# Patient Record
Sex: MALE | Race: WHITE | URBAN - METROPOLITAN AREA
[De-identification: names, ages, dates, MRNs, and addresses within clinical notes are randomized per-mention and may not be internally consistent; named-entity substitution may affect disease eponyms.]

---

## 2020-07-07 ENCOUNTER — OFFICE VISIT (OUTPATIENT)
Dept: ORTHOPEDIC SURGERY | Age: 85
End: 2020-07-07
Payer: MEDICARE

## 2020-07-07 VITALS — HEIGHT: 70 IN | BODY MASS INDEX: 28.63 KG/M2 | WEIGHT: 200 LBS | RESPIRATION RATE: 17 BRPM

## 2020-07-07 PROBLEM — M72.0 DUPUYTREN'S CONTRACTURE OF HAND: Status: ACTIVE | Noted: 2020-07-07

## 2020-07-07 PROBLEM — M77.8 TENDINITIS OF FLEXOR TENDON OF RIGHT HAND: Status: ACTIVE | Noted: 2020-07-07

## 2020-07-07 PROCEDURE — 4004F PT TOBACCO SCREEN RCVD TLK: CPT | Performed by: ORTHOPAEDIC SURGERY

## 2020-07-07 PROCEDURE — 4040F PNEUMOC VAC/ADMIN/RCVD: CPT | Performed by: ORTHOPAEDIC SURGERY

## 2020-07-07 PROCEDURE — 20550 NJX 1 TENDON SHEATH/LIGAMENT: CPT | Performed by: ORTHOPAEDIC SURGERY

## 2020-07-07 PROCEDURE — G8427 DOCREV CUR MEDS BY ELIG CLIN: HCPCS | Performed by: ORTHOPAEDIC SURGERY

## 2020-07-07 PROCEDURE — 99203 OFFICE O/P NEW LOW 30 MIN: CPT | Performed by: ORTHOPAEDIC SURGERY

## 2020-07-07 PROCEDURE — 1123F ACP DISCUSS/DSCN MKR DOCD: CPT | Performed by: ORTHOPAEDIC SURGERY

## 2020-07-07 PROCEDURE — G8417 CALC BMI ABV UP PARAM F/U: HCPCS | Performed by: ORTHOPAEDIC SURGERY

## 2020-07-07 RX ORDER — BETAMETHASONE SODIUM PHOSPHATE AND BETAMETHASONE ACETATE 3; 3 MG/ML; MG/ML
6 INJECTION, SUSPENSION INTRA-ARTICULAR; INTRALESIONAL; INTRAMUSCULAR; SOFT TISSUE ONCE
Status: COMPLETED | OUTPATIENT
Start: 2020-07-07 | End: 2020-07-07

## 2020-07-07 RX ORDER — LIDOCAINE HYDROCHLORIDE 10 MG/ML
1 INJECTION, SOLUTION INFILTRATION; PERINEURAL ONCE
Status: COMPLETED | OUTPATIENT
Start: 2020-07-07 | End: 2020-07-07

## 2020-07-07 RX ADMIN — LIDOCAINE HYDROCHLORIDE 1 ML: 10 INJECTION, SOLUTION INFILTRATION; PERINEURAL at 10:51

## 2020-07-07 RX ADMIN — BETAMETHASONE SODIUM PHOSPHATE AND BETAMETHASONE ACETATE 6 MG: 3; 3 INJECTION, SUSPENSION INTRA-ARTICULAR; INTRALESIONAL; INTRAMUSCULAR; SOFT TISSUE at 10:50

## 2020-07-07 NOTE — PROGRESS NOTES
Chief Complaint  Hand Pain (NP RT HAND)      History of Present Illness:  Cherie Patricio is a 80 y.o. male. He presents today for a new hand surgery specialty evaluation regarding his right hand. Many years ago I took care of him and we believe that I took care of him for a trigger finger which resolved very nicely with a cortisone injection. He has also had some prominence along the ring finger palmar aspect consistent with Dupuytren's and he has noticed some increasing pain when he  a golf club. He has not been noticing any overt locking of the finger nor has he had a recent trauma. Other of friends of mine and he has been visiting from [place]. Medical History  Patient's medications, allergies, past medical, surgical, social and family histories were reviewed and updated as appropriate. Review of Systems  Pertinent items are noted in HPI  Denies fever, chills, confusion, bowel/bladder active change. Review of systems reviewed from Patient History Form dated on 7/7/20 and available in the patient's chart under the Media tab. Vital Signs  Vitals:    07/07/20 1013   Resp: 17       General Exam:   Constitutional: Patient is adequately groomed with no evidence of malnutrition  Mental Status: The patient is oriented to time, place and person. The patient's mood and affect are appropriate. Lymphatic: The lymphatic examination bilaterally reveals all areas to be without enlargement or induration. Neurological: The patient has good coordination. There is no weakness or sensory deficit. Hand Examination  Inspection: On exam today he has a mild visible pretendinous cord consistent with Dupuytren's along the right ring finger but without fixed flexion contracture    Palpation: There is tenderness when I palpate along the pretendinous cord, but it is unclear whether this is directly over the A1 pulley or primarily along the cord itself.     Range of Motion: Full range of motion without triggering and without fixed flexion contracture    Strength: Normal    Special Tests: Provocative testing for carpal tunnel syndrome is negative, tabletop test is negative without any flexion contracture. Skin: There are no additional worrisome rashes, ulcerations or lesions. Gait: normal    Circulation: well perfused    Additional Comments:     Additional Examinations:  Left Upper Extremity: Examination of the left upper extremity does not show any tenderness, deformity or injury. Range of motion is unremarkable. There is no gross instability. There are no rashes, ulcerations or lesions. Strength and tone are normal.       Radiology:     X-rays obtained and reviewed in office:  Views    Location    Impression        Assessment: Right hand pain with either a mild and emerging trigger finger, or painful Dupuytren's cord    Impression:   Encounter Diagnoses   Name Primary?  Trigger ring finger of right hand Yes    Tendinitis of flexor tendon of right hand     Dupuytren's contracture of hand        Office Procedures:  Orders Placed This Encounter   Procedures    UT INJECT TENDON SHEATH/LIGAMENT       Treatment Plan: I discussed with the patient the overall condition of Dupuytren's and also the likelihood that in the past what we treated him for may have been a trigger finger. He recalls having excellent relief from an injection in the past.    I would recommend essentially performing a trigger injection along the flexor tendon sheath with the thought that this may be able to alleviate any emerging irritation along the A1 pulley, but may also be able to soften some of the soreness along the fascia. Under sterile conditions, I injected the right ring finger at the A1 pulley and flexor tendon sheath, also carefully infiltrating slightly along the pretendinous cord, with 1% lidocaine and 1 mL of Celestone. Appropriate injection risks and instructions were discussed.     I have also advised in terms of using a golf glove on the right hand to see if that would help any possible irritation from pressure along his Dupuytren's cord. I will be happy to see him back on an as-needed basis moving forward should any of his symptoms fail to become more manageable.

## 2020-07-14 ENCOUNTER — OFFICE VISIT (OUTPATIENT)
Dept: ORTHOPEDIC SURGERY | Age: 85
End: 2020-07-14
Payer: MEDICARE

## 2020-07-14 VITALS — HEIGHT: 70 IN | RESPIRATION RATE: 17 BRPM | BODY MASS INDEX: 28.63 KG/M2 | WEIGHT: 200 LBS

## 2020-07-14 PROCEDURE — G8417 CALC BMI ABV UP PARAM F/U: HCPCS | Performed by: ORTHOPAEDIC SURGERY

## 2020-07-14 PROCEDURE — 4040F PNEUMOC VAC/ADMIN/RCVD: CPT | Performed by: ORTHOPAEDIC SURGERY

## 2020-07-14 PROCEDURE — G8427 DOCREV CUR MEDS BY ELIG CLIN: HCPCS | Performed by: ORTHOPAEDIC SURGERY

## 2020-07-14 PROCEDURE — 20550 NJX 1 TENDON SHEATH/LIGAMENT: CPT | Performed by: ORTHOPAEDIC SURGERY

## 2020-07-14 PROCEDURE — 99213 OFFICE O/P EST LOW 20 MIN: CPT | Performed by: ORTHOPAEDIC SURGERY

## 2020-07-14 PROCEDURE — 4004F PT TOBACCO SCREEN RCVD TLK: CPT | Performed by: ORTHOPAEDIC SURGERY

## 2020-07-14 PROCEDURE — 1123F ACP DISCUSS/DSCN MKR DOCD: CPT | Performed by: ORTHOPAEDIC SURGERY

## 2020-07-14 RX ORDER — LIDOCAINE HYDROCHLORIDE 10 MG/ML
1 INJECTION, SOLUTION INFILTRATION; PERINEURAL ONCE
Status: COMPLETED | OUTPATIENT
Start: 2020-07-14 | End: 2020-07-14

## 2020-07-14 RX ORDER — BETAMETHASONE SODIUM PHOSPHATE AND BETAMETHASONE ACETATE 3; 3 MG/ML; MG/ML
6 INJECTION, SUSPENSION INTRA-ARTICULAR; INTRALESIONAL; INTRAMUSCULAR; SOFT TISSUE ONCE
Status: COMPLETED | OUTPATIENT
Start: 2020-07-14 | End: 2020-07-14

## 2020-07-14 RX ADMIN — BETAMETHASONE SODIUM PHOSPHATE AND BETAMETHASONE ACETATE 6 MG: 3; 3 INJECTION, SUSPENSION INTRA-ARTICULAR; INTRALESIONAL; INTRAMUSCULAR; SOFT TISSUE at 11:25

## 2020-07-14 RX ADMIN — LIDOCAINE HYDROCHLORIDE 1 ML: 10 INJECTION, SOLUTION INFILTRATION; PERINEURAL at 11:26

## 2020-07-14 NOTE — PROGRESS NOTES
Chief Complaint:  Hand Pain (CK RT HAND)      History of Present of Illness: The patient returns with his daughter today and states that the trigger finger injection really did not give any relief. Again, he is adamant that the symptoms to him appear to be arising from the thickness and the palmar fascia along the ring finger ray. I have known his daughter for a long time and she has felt that even his penmanship and his overall handwriting has been affected by the discomfort. He does not report any nighttime discomfort and states that the tingling that he may feel in the hand is relegated to the long and ring fingers near the MP region. Review of Systems  Pertinent items are noted in HPI  Denies fever, chills, confusion, bowel/bladder active change. Review of systems reviewed from Patient History Form dated on 7/7/2020 and available in the patient's chart under the Media tab. Examination:  On exam today there is no overt locking and on my exam there is no specific tenderness over the A1 pulley. It is difficult to get an exact reproduction of his pain, but he seems to be most consistent with tenderness if I palpate very firmly over the very mild pretendinous cord and Dupuytren's thickening along the palmar fascia. There is no flexion contracture and he has a negative normal tabletop test.    Provocative testing for carpal tunnel syndrome and ulnar neuropathy is negative. There is excellent firing of the thenar and intrinsic muscles without any weakness or atrophy. All fingers are nicely perfused. Radiology:     X-rays obtained and reviewed in office:  Views    Location    Impression      No orders of the defined types were placed in this encounter. Impression:  No diagnosis found. Treatment Plan:  Again I am somewhat puzzled by the slightly atypical presentation. However, he does communicate that this bothers him greatly.   He feels that many years ago I did an injection or some procedure in the office that made all of these similar symptoms go away. With regards to the Dupuytren's we discussed the options of conservative care, cortisone injection, collagenase injection, and surgery. I certainly do not want to over treat here, and I would be a bit concerned about collagenase with his delicate skin. We agreed on a plan for cortisone injection more superficially, this time not along the tendon sheath but directly into the area of the palmar fascia where he feels the most discomfort. Under sterile conditions and with 1% plain lidocaine I injected 1 cc of Celestone in multiple sites through the pretendinous cord in the location of maximum tenderness and prominence. Postinjection instructions are given. We agreed that down the road if we simply are not seeing dramatic improvement given the progressive nature of Dupuytren's, an excellent option is to simply continue with conservative care. However, if his symptoms are worsening and become more concerning for compressive neuropathy or other unusual etiology, either electrodiagnostic testing or even advanced imaging can be considered. Please note that this transcription was created using voice recognition software. Any errors are unintentional and may be due to voice recognition transcription.